# Patient Record
Sex: FEMALE | URBAN - METROPOLITAN AREA
[De-identification: names, ages, dates, MRNs, and addresses within clinical notes are randomized per-mention and may not be internally consistent; named-entity substitution may affect disease eponyms.]

---

## 2024-09-19 ENCOUNTER — APPOINTMENT (OUTPATIENT)
Dept: RADIOLOGY | Facility: MEDICAL CENTER | Age: 74
DRG: 189 | End: 2024-09-19
Attending: STUDENT IN AN ORGANIZED HEALTH CARE EDUCATION/TRAINING PROGRAM

## 2024-09-19 ENCOUNTER — HOSPITAL ENCOUNTER (INPATIENT)
Facility: MEDICAL CENTER | Age: 74
LOS: 2 days | DRG: 189 | End: 2024-09-21
Attending: STUDENT IN AN ORGANIZED HEALTH CARE EDUCATION/TRAINING PROGRAM | Admitting: STUDENT IN AN ORGANIZED HEALTH CARE EDUCATION/TRAINING PROGRAM

## 2024-09-19 DIAGNOSIS — R60.0 PERIPHERAL EDEMA: ICD-10-CM

## 2024-09-19 DIAGNOSIS — M25.561 ACUTE PAIN OF RIGHT KNEE: ICD-10-CM

## 2024-09-19 DIAGNOSIS — M25.512 ACUTE PAIN OF LEFT SHOULDER: ICD-10-CM

## 2024-09-19 DIAGNOSIS — I50.9 ACUTE ON CHRONIC CONGESTIVE HEART FAILURE, UNSPECIFIED HEART FAILURE TYPE (HCC): ICD-10-CM

## 2024-09-19 DIAGNOSIS — I50.9 CONGESTIVE HEART FAILURE, UNSPECIFIED HF CHRONICITY, UNSPECIFIED HEART FAILURE TYPE (HCC): ICD-10-CM

## 2024-09-19 DIAGNOSIS — J96.01 ACUTE HYPOXIC RESPIRATORY FAILURE (HCC): Primary | ICD-10-CM

## 2024-09-19 PROBLEM — D72.829 LEUKOCYTOSIS: Status: ACTIVE | Noted: 2024-09-19

## 2024-09-19 PROBLEM — N18.9 CHRONIC KIDNEY DISEASE: Status: ACTIVE | Noted: 2024-09-19

## 2024-09-19 LAB
ALBUMIN SERPL BCP-MCNC: 3.5 G/DL (ref 3.2–4.9)
ALBUMIN/GLOB SERPL: 1.2 G/DL
ALP SERPL-CCNC: 118 U/L (ref 30–99)
ALT SERPL-CCNC: 20 U/L (ref 2–50)
ANION GAP SERPL CALC-SCNC: 12 MMOL/L (ref 7–16)
AST SERPL-CCNC: 20 U/L (ref 12–45)
BASOPHILS # BLD AUTO: 0.1 % (ref 0–1.8)
BASOPHILS # BLD: 0.02 K/UL (ref 0–0.12)
BILIRUB SERPL-MCNC: 0.5 MG/DL (ref 0.1–1.5)
BUN SERPL-MCNC: 32 MG/DL (ref 8–22)
CALCIUM ALBUM COR SERPL-MCNC: 9 MG/DL (ref 8.5–10.5)
CALCIUM SERPL-MCNC: 8.6 MG/DL (ref 8.5–10.5)
CHLORIDE SERPL-SCNC: 102 MMOL/L (ref 96–112)
CO2 SERPL-SCNC: 19 MMOL/L (ref 20–33)
CREAT SERPL-MCNC: 1.35 MG/DL (ref 0.5–1.4)
EOSINOPHIL # BLD AUTO: 0.08 K/UL (ref 0–0.51)
EOSINOPHIL NFR BLD: 0.6 % (ref 0–6.9)
ERYTHROCYTE [DISTWIDTH] IN BLOOD BY AUTOMATED COUNT: 51.7 FL (ref 35.9–50)
EST. AVERAGE GLUCOSE BLD GHB EST-MCNC: 140 MG/DL
FLUAV RNA SPEC QL NAA+PROBE: NEGATIVE
FLUBV RNA SPEC QL NAA+PROBE: NEGATIVE
GFR SERPLBLD CREATININE-BSD FMLA CKD-EPI: 41 ML/MIN/1.73 M 2
GLOBULIN SER CALC-MCNC: 2.9 G/DL (ref 1.9–3.5)
GLUCOSE BLD STRIP.AUTO-MCNC: 140 MG/DL (ref 65–99)
GLUCOSE SERPL-MCNC: 151 MG/DL (ref 65–99)
HBA1C MFR BLD: 6.5 % (ref 4–5.6)
HCT VFR BLD AUTO: 35.7 % (ref 37–47)
HGB BLD-MCNC: 11.6 G/DL (ref 12–16)
IMM GRANULOCYTES # BLD AUTO: 0.08 K/UL (ref 0–0.11)
IMM GRANULOCYTES NFR BLD AUTO: 0.6 % (ref 0–0.9)
LYMPHOCYTES # BLD AUTO: 1.01 K/UL (ref 1–4.8)
LYMPHOCYTES NFR BLD: 7.3 % (ref 22–41)
MCH RBC QN AUTO: 31.9 PG (ref 27–33)
MCHC RBC AUTO-ENTMCNC: 32.5 G/DL (ref 32.2–35.5)
MCV RBC AUTO: 98.1 FL (ref 81.4–97.8)
MONOCYTES # BLD AUTO: 1.25 K/UL (ref 0–0.85)
MONOCYTES NFR BLD AUTO: 9.1 % (ref 0–13.4)
NEUTROPHILS # BLD AUTO: 11.34 K/UL (ref 1.82–7.42)
NEUTROPHILS NFR BLD: 82.3 % (ref 44–72)
NRBC # BLD AUTO: 0 K/UL
NRBC BLD-RTO: 0 /100 WBC (ref 0–0.2)
NT-PROBNP SERPL IA-MCNC: 1547 PG/ML (ref 0–125)
PLATELET # BLD AUTO: 314 K/UL (ref 164–446)
PMV BLD AUTO: 9.6 FL (ref 9–12.9)
POTASSIUM SERPL-SCNC: 5.4 MMOL/L (ref 3.6–5.5)
PROT SERPL-MCNC: 6.4 G/DL (ref 6–8.2)
RBC # BLD AUTO: 3.64 M/UL (ref 4.2–5.4)
RHEUMATOID FACT SER IA-ACNC: 20 IU/ML (ref 0–14)
RSV RNA SPEC QL NAA+PROBE: NEGATIVE
SARS-COV-2 RNA RESP QL NAA+PROBE: NOTDETECTED
SODIUM SERPL-SCNC: 133 MMOL/L (ref 135–145)
TROPONIN T SERPL-MCNC: 21 NG/L (ref 6–19)
WBC # BLD AUTO: 13.8 K/UL (ref 4.8–10.8)

## 2024-09-19 PROCEDURE — 86431 RHEUMATOID FACTOR QUANT: CPT

## 2024-09-19 PROCEDURE — 71045 X-RAY EXAM CHEST 1 VIEW: CPT

## 2024-09-19 PROCEDURE — 36415 COLL VENOUS BLD VENIPUNCTURE: CPT

## 2024-09-19 PROCEDURE — 0241U HCHG SARS-COV-2 COVID-19 NFCT DS RESP RNA 4 TRGT ED POC: CPT

## 2024-09-19 PROCEDURE — 99223 1ST HOSP IP/OBS HIGH 75: CPT | Performed by: STUDENT IN AN ORGANIZED HEALTH CARE EDUCATION/TRAINING PROGRAM

## 2024-09-19 PROCEDURE — 93970 EXTREMITY STUDY: CPT | Mod: 26 | Performed by: INTERNAL MEDICINE

## 2024-09-19 PROCEDURE — 85025 COMPLETE CBC W/AUTO DIFF WBC: CPT

## 2024-09-19 PROCEDURE — 93971 EXTREMITY STUDY: CPT | Mod: 26,LT | Performed by: INTERNAL MEDICINE

## 2024-09-19 PROCEDURE — 83036 HEMOGLOBIN GLYCOSYLATED A1C: CPT

## 2024-09-19 PROCEDURE — 82962 GLUCOSE BLOOD TEST: CPT

## 2024-09-19 PROCEDURE — 700111 HCHG RX REV CODE 636 W/ 250 OVERRIDE (IP): Performed by: STUDENT IN AN ORGANIZED HEALTH CARE EDUCATION/TRAINING PROGRAM

## 2024-09-19 PROCEDURE — 84484 ASSAY OF TROPONIN QUANT: CPT

## 2024-09-19 PROCEDURE — 80053 COMPREHEN METABOLIC PANEL: CPT

## 2024-09-19 PROCEDURE — 73030 X-RAY EXAM OF SHOULDER: CPT | Mod: LT

## 2024-09-19 PROCEDURE — 770020 HCHG ROOM/CARE - TELE (206)

## 2024-09-19 PROCEDURE — A9270 NON-COVERED ITEM OR SERVICE: HCPCS | Performed by: STUDENT IN AN ORGANIZED HEALTH CARE EDUCATION/TRAINING PROGRAM

## 2024-09-19 PROCEDURE — 93005 ELECTROCARDIOGRAM TRACING: CPT | Performed by: STUDENT IN AN ORGANIZED HEALTH CARE EDUCATION/TRAINING PROGRAM

## 2024-09-19 PROCEDURE — 93970 EXTREMITY STUDY: CPT

## 2024-09-19 PROCEDURE — 96374 THER/PROPH/DIAG INJ IV PUSH: CPT

## 2024-09-19 PROCEDURE — 93971 EXTREMITY STUDY: CPT | Mod: LT

## 2024-09-19 PROCEDURE — 83880 ASSAY OF NATRIURETIC PEPTIDE: CPT

## 2024-09-19 PROCEDURE — 73562 X-RAY EXAM OF KNEE 3: CPT | Mod: RT

## 2024-09-19 PROCEDURE — 99285 EMERGENCY DEPT VISIT HI MDM: CPT

## 2024-09-19 PROCEDURE — 700102 HCHG RX REV CODE 250 W/ 637 OVERRIDE(OP): Performed by: STUDENT IN AN ORGANIZED HEALTH CARE EDUCATION/TRAINING PROGRAM

## 2024-09-19 RX ORDER — HEPARIN SODIUM 5000 [USP'U]/ML
5000 INJECTION, SOLUTION INTRAVENOUS; SUBCUTANEOUS EVERY 8 HOURS
Status: DISCONTINUED | OUTPATIENT
Start: 2024-09-19 | End: 2024-09-21 | Stop reason: HOSPADM

## 2024-09-19 RX ORDER — AMLODIPINE BESYLATE 5 MG/1
5 TABLET ORAL
Status: DISCONTINUED | OUTPATIENT
Start: 2024-09-20 | End: 2024-09-21 | Stop reason: HOSPADM

## 2024-09-19 RX ORDER — ASPIRIN 81 MG/1
81 TABLET ORAL DAILY
Status: DISCONTINUED | OUTPATIENT
Start: 2024-09-20 | End: 2024-09-21 | Stop reason: HOSPADM

## 2024-09-19 RX ORDER — DEXTROSE MONOHYDRATE 25 G/50ML
25 INJECTION, SOLUTION INTRAVENOUS
Status: DISCONTINUED | OUTPATIENT
Start: 2024-09-19 | End: 2024-09-21 | Stop reason: HOSPADM

## 2024-09-19 RX ORDER — FUROSEMIDE 10 MG/ML
40 INJECTION INTRAMUSCULAR; INTRAVENOUS
Status: DISCONTINUED | OUTPATIENT
Start: 2024-09-19 | End: 2024-09-20

## 2024-09-19 RX ORDER — ACETAMINOPHEN 325 MG/1
650 TABLET ORAL EVERY 6 HOURS PRN
Status: DISCONTINUED | OUTPATIENT
Start: 2024-09-19 | End: 2024-09-21 | Stop reason: HOSPADM

## 2024-09-19 RX ORDER — HYDRALAZINE HYDROCHLORIDE 20 MG/ML
10 INJECTION INTRAMUSCULAR; INTRAVENOUS EVERY 4 HOURS PRN
Status: DISCONTINUED | OUTPATIENT
Start: 2024-09-19 | End: 2024-09-21 | Stop reason: HOSPADM

## 2024-09-19 RX ADMIN — HEPARIN SODIUM 5000 UNITS: 5000 INJECTION, SOLUTION INTRAVENOUS; SUBCUTANEOUS at 21:26

## 2024-09-19 RX ADMIN — FUROSEMIDE 40 MG: 10 INJECTION INTRAMUSCULAR; INTRAVENOUS at 19:47

## 2024-09-19 RX ADMIN — ACETAMINOPHEN 650 MG: 325 TABLET ORAL at 21:32

## 2024-09-19 SDOH — ECONOMIC STABILITY: TRANSPORTATION INSECURITY
IN THE PAST 12 MONTHS, HAS LACK OF RELIABLE TRANSPORTATION KEPT YOU FROM MEDICAL APPOINTMENTS, MEETINGS, WORK OR FROM GETTING THINGS NEEDED FOR DAILY LIVING?: NO

## 2024-09-19 SDOH — ECONOMIC STABILITY: TRANSPORTATION INSECURITY
IN THE PAST 12 MONTHS, HAS THE LACK OF TRANSPORTATION KEPT YOU FROM MEDICAL APPOINTMENTS OR FROM GETTING MEDICATIONS?: NO

## 2024-09-19 ASSESSMENT — COGNITIVE AND FUNCTIONAL STATUS - GENERAL
MOVING FROM LYING ON BACK TO SITTING ON SIDE OF FLAT BED: A LITTLE
MOVING TO AND FROM BED TO CHAIR: A LOT
DRESSING REGULAR UPPER BODY CLOTHING: A LOT
STANDING UP FROM CHAIR USING ARMS: A LOT
TURNING FROM BACK TO SIDE WHILE IN FLAT BAD: A LITTLE
SUGGESTED CMS G CODE MODIFIER DAILY ACTIVITY: CK
TOILETING: A LOT
CLIMB 3 TO 5 STEPS WITH RAILING: A LOT
MOBILITY SCORE: 14
WALKING IN HOSPITAL ROOM: A LOT
SUGGESTED CMS G CODE MODIFIER MOBILITY: CL
HELP NEEDED FOR BATHING: A LOT
DRESSING REGULAR LOWER BODY CLOTHING: A LITTLE
DAILY ACTIVITIY SCORE: 17

## 2024-09-19 ASSESSMENT — ENCOUNTER SYMPTOMS
PALPITATIONS: 0
NECK PAIN: 0
HEMOPTYSIS: 0
FEVER: 0
EYE PAIN: 0
DOUBLE VISION: 0
SHORTNESS OF BREATH: 1
COUGH: 0
SINUS PAIN: 0
ORTHOPNEA: 1
DIZZINESS: 0
BACK PAIN: 0
PHOTOPHOBIA: 0
VOMITING: 0
ABDOMINAL PAIN: 0
FLANK PAIN: 0
SPUTUM PRODUCTION: 0
CHILLS: 0
DIARRHEA: 0
HEADACHES: 0
NAUSEA: 0
CLAUDICATION: 0
EYE DISCHARGE: 0

## 2024-09-19 ASSESSMENT — LIFESTYLE VARIABLES
DOES PATIENT WANT TO STOP DRINKING: NO
TOTAL SCORE: 0
EVER HAD A DRINK FIRST THING IN THE MORNING TO STEADY YOUR NERVES TO GET RID OF A HANGOVER: NO
HOW MANY TIMES IN THE PAST YEAR HAVE YOU HAD 5 OR MORE DRINKS IN A DAY: 0
CONSUMPTION TOTAL: NEGATIVE
HAVE PEOPLE ANNOYED YOU BY CRITICIZING YOUR DRINKING: NO
TOTAL SCORE: 0
HAVE YOU EVER FELT YOU SHOULD CUT DOWN ON YOUR DRINKING: NO
ALCOHOL_USE: NO
TOTAL SCORE: 0
DOES PATIENT WANT TO STOP DRINKING: NO
EVER FELT BAD OR GUILTY ABOUT YOUR DRINKING: NO
ALCOHOL_USE: NO
EVER HAD A DRINK FIRST THING IN THE MORNING TO STEADY YOUR NERVES TO GET RID OF A HANGOVER: NO
HAVE PEOPLE ANNOYED YOU BY CRITICIZING YOUR DRINKING: NO
ON A TYPICAL DAY WHEN YOU DRINK ALCOHOL HOW MANY DRINKS DO YOU HAVE: 0
EVER FELT BAD OR GUILTY ABOUT YOUR DRINKING: NO
AVERAGE NUMBER OF DAYS PER WEEK YOU HAVE A DRINK CONTAINING ALCOHOL: 0

## 2024-09-19 ASSESSMENT — PATIENT HEALTH QUESTIONNAIRE - PHQ9
1. LITTLE INTEREST OR PLEASURE IN DOING THINGS: NOT AT ALL
2. FEELING DOWN, DEPRESSED, IRRITABLE, OR HOPELESS: NOT AT ALL
SUM OF ALL RESPONSES TO PHQ9 QUESTIONS 1 AND 2: 0

## 2024-09-19 ASSESSMENT — PAIN DESCRIPTION - PAIN TYPE
TYPE: ACUTE PAIN
TYPE: ACUTE PAIN

## 2024-09-19 ASSESSMENT — SOCIAL DETERMINANTS OF HEALTH (SDOH)
IN THE PAST 12 MONTHS, HAS THE ELECTRIC, GAS, OIL, OR WATER COMPANY THREATENED TO SHUT OFF SERVICE IN YOUR HOME?: NO
WITHIN THE LAST YEAR, HAVE YOU BEEN AFRAID OF YOUR PARTNER OR EX-PARTNER?: NO
WITHIN THE LAST YEAR, HAVE TO BEEN RAPED OR FORCED TO HAVE ANY KIND OF SEXUAL ACTIVITY BY YOUR PARTNER OR EX-PARTNER?: NO
WITHIN THE LAST YEAR, HAVE YOU BEEN KICKED, HIT, SLAPPED, OR OTHERWISE PHYSICALLY HURT BY YOUR PARTNER OR EX-PARTNER?: NO
WITHIN THE LAST YEAR, HAVE YOU BEEN HUMILIATED OR EMOTIONALLY ABUSED IN OTHER WAYS BY YOUR PARTNER OR EX-PARTNER?: NO
WITHIN THE PAST 12 MONTHS, THE FOOD YOU BOUGHT JUST DIDN'T LAST AND YOU DIDN'T HAVE MONEY TO GET MORE: NEVER TRUE
WITHIN THE PAST 12 MONTHS, YOU WORRIED THAT YOUR FOOD WOULD RUN OUT BEFORE YOU GOT THE MONEY TO BUY MORE: NEVER TRUE

## 2024-09-19 ASSESSMENT — FIBROSIS 4 INDEX: FIB4 SCORE: 1.07

## 2024-09-19 NOTE — ED TRIAGE NOTES
Chief Complaint   Patient presents with    Peripheral Edema     Pt reports bilateral upper and lower extremity edema since Friday. Hx of renal failure and heart failure. Denies SOB. Denies chest pain.     Pt is AAOx4, Greenlandic speaking, answers questions appropriately, speaking in full sentences. Is visiting here from [place].   Pt given 1G tylenol and 250 NS by EMS.

## 2024-09-19 NOTE — ED PROVIDER NOTES
"ED Provider Note    CHIEF COMPLAINT  Chief Complaint   Patient presents with    Peripheral Edema     Pt reports bilateral upper and lower extremity edema since Friday. Hx of renal failure and heart failure. Denies SOB. Denies chest pain.       EXTERNAL RECORDS REVIEWED  NO records available    HPI/ROS  LIMITATION TO HISTORY   Select: : None  OUTSIDE HISTORIAN(S):  Family Daughter in law at bedside    Patience Antony is a 74 y.o. female with history of diabetes, heart failure, CKD who presents for evaluation of peripheral edema.  The patient is from [place] and she arrived here September 9.  For the past week she has been having edema to her bilateral lower extremities left greater than right.  She is also having pain in the right knee and swelling in the left arm.  She is having orthopnea as well.  She denies any chest pain or difficulty breathing.  She was seen at the [facility] who was concerned for blood clot therefore referred to the ER.  She does not think that she is ever had a blood clot before.  She is on aspirin as well as Lasix.    PAST MEDICAL HISTORY   She has history of diabetes, heart failure, CKD    SURGICAL HISTORY  patient denies any surgical history    FAMILY HISTORY  No family history on file.    SOCIAL HISTORY  Social History     Tobacco Use    Smoking status: Not on file    Smokeless tobacco: Not on file   Substance and Sexual Activity    Alcohol use: Not on file    Drug use: Not on file    Sexual activity: Not on file       CURRENT MEDICATIONS  Home Medications       Reviewed by Tommie Barba, Student (Nurse Apprentice) on 09/19/24 at 1544  Med List Status: <None>     Medication Last Dose Status        Patient Anshul Taking any Medications                           ALLERGIES  Not on File    PHYSICAL EXAM  VITAL SIGNS: BP (!) 198/84   Pulse 80   Temp 36.5 °C (97.7 °F) (Temporal)   Resp 16   Ht 1.575 m (5' 2\")   Wt 95 kg (209 lb 7 oz)   SpO2 91%   BMI 38.31 kg/m²  "     Constitutional: Well developed, Well nourished, No acute respiratory distress, Non-toxic appearance.   HENT: Normocephalic, Atraumatic, Bilateral external ears normal, Oropharynx clear, mucous membranes are moist.  Eyes: Conjunctiva normal, No discharge. No icterus.  Neck: Normal range of motion. Supple.  Cardiovascular: Normal heart rate, Normal rhythm, No murmurs, No rubs, No gallops.   Thorax & Lungs: Clear to auscultation bilaterally, No respiratory distress, No wheezing.  Abdomen: Soft nontender normal bowel sounds no rebound masses or peritoneal signs  Skin: Warm, Dry, No erythema, No rash.   Extremities: Intact distal pulses, Edema to bilateral lower extremities, left greater than right. Edema noted to the left upper extremity, mild.   Musculoskeletal: Reports tenderness to left shoulder with decreased range of motion due to pain but is intact. Tenderness to right knee but is able to range. No erythema to the knee  Neurologic: Alert & oriented. No focal deficits noted.   Psych: Alert normal affect       EKG/LABS  Results for orders placed or performed during the hospital encounter of 09/19/24   CBC WITH DIFFERENTIAL   Result Value Ref Range    WBC 13.8 (H) 4.8 - 10.8 K/uL    RBC 3.64 (L) 4.20 - 5.40 M/uL    Hemoglobin 11.6 (L) 12.0 - 16.0 g/dL    Hematocrit 35.7 (L) 37.0 - 47.0 %    MCV 98.1 (H) 81.4 - 97.8 fL    MCH 31.9 27.0 - 33.0 pg    MCHC 32.5 32.2 - 35.5 g/dL    RDW 51.7 (H) 35.9 - 50.0 fL    Platelet Count 314 164 - 446 K/uL    MPV 9.6 9.0 - 12.9 fL    Neutrophils-Polys 82.30 (H) 44.00 - 72.00 %    Lymphocytes 7.30 (L) 22.00 - 41.00 %    Monocytes 9.10 0.00 - 13.40 %    Eosinophils 0.60 0.00 - 6.90 %    Basophils 0.10 0.00 - 1.80 %    Immature Granulocytes 0.60 0.00 - 0.90 %    Nucleated RBC 0.00 0.00 - 0.20 /100 WBC    Neutrophils (Absolute) 11.34 (H) 1.82 - 7.42 K/uL    Lymphs (Absolute) 1.01 1.00 - 4.80 K/uL    Monos (Absolute) 1.25 (H) 0.00 - 0.85 K/uL    Eos (Absolute) 0.08 0.00 - 0.51 K/uL     Baso (Absolute) 0.02 0.00 - 0.12 K/uL    Immature Granulocytes (abs) 0.08 0.00 - 0.11 K/uL    NRBC (Absolute) 0.00 K/uL   COMP METABOLIC PANEL   Result Value Ref Range    Sodium 133 (L) 135 - 145 mmol/L    Potassium 5.4 3.6 - 5.5 mmol/L    Chloride 102 96 - 112 mmol/L    Co2 19 (L) 20 - 33 mmol/L    Anion Gap 12.0 7.0 - 16.0    Glucose 151 (H) 65 - 99 mg/dL    Bun 32 (H) 8 - 22 mg/dL    Creatinine 1.35 0.50 - 1.40 mg/dL    Calcium 8.6 8.5 - 10.5 mg/dL    Correct Calcium 9.0 8.5 - 10.5 mg/dL    AST(SGOT) 20 12 - 45 U/L    ALT(SGPT) 20 2 - 50 U/L    Alkaline Phosphatase 118 (H) 30 - 99 U/L    Total Bilirubin 0.5 0.1 - 1.5 mg/dL    Albumin 3.5 3.2 - 4.9 g/dL    Total Protein 6.4 6.0 - 8.2 g/dL    Globulin 2.9 1.9 - 3.5 g/dL    A-G Ratio 1.2 g/dL   TROPONIN   Result Value Ref Range    Troponin T 21 (H) 6 - 19 ng/L   proBrain Natriuretic Peptide, NT   Result Value Ref Range    NT-proBNP 1547 (H) 0 - 125 pg/mL   ESTIMATED GFR   Result Value Ref Range    GFR (CKD-EPI) 41 (A) >60 mL/min/1.73 m 2   RHEUMATOID ARTHRITIS FACTOR   Result Value Ref Range    Rheumatoid Factor -Neph- 20 (H) 0 - 14 IU/mL   HEMOGLOBIN A1C   Result Value Ref Range    Glycohemoglobin 6.5 (H) 4.0 - 5.6 %    Est Avg Glucose 140 mg/dL   EKG (NOW)   Result Value Ref Range    Report       [facility] Emergency Dept.    Test Date:  2024  Pt Name:    IVAN CHAVEZ                Department: ER  MRN:        [id number]                      Room:        39  Gender:     Female                       Technician: [id number]  :        [id number]                   Requested By:JUAN DAVID TERRAZAS  Order #:    [id number]                    Reading MD:    Measurements  Intervals                                Axis  Rate:       79                           P:          55  VT:         143                          QRS:        66  QRSD:       86                           T:          41  QT:         351  QTc:        403    Interpretive  Statements  Sinus rhythm  No previous ECG available for comparison     POC CoV-2, FLU A/B, RSV by PCR   Result Value Ref Range    POC Influenza A RNA, PCR Negative Negative    POC Influenza B RNA, PCR Negative Negative    POC RSV, by PCR Negative Negative    POC SARS-CoV-2, PCR NotDetected NotDetected   POCT glucose device results   Result Value Ref Range    POC Glucose, Blood 140 (H) 65 - 99 mg/dL     I have independently interpreted this EKG    RADIOLOGY/PROCEDURES   I have independently interpreted the diagnostic imaging associated with this visit and am waiting the final reading from the radiologist.   My preliminary interpretation is as follows: no fracture    Radiologist interpretation:  US-EXTREMITY VENOUS LOWER BILAT   Final Result      US-EXTREMITY VENOUS UPPER UNILAT LEFT   Final Result      DX-KNEE 3 VIEWS RIGHT   Final Result         1.  No radiographic evidence of acute injury.         2. Moderate severe osteoarthritic changes of the medial compartment and patellofemoral joint.      DX-SHOULDER 2+ LEFT   Final Result      1.  Normal shoulder series.      DX-CHEST-PORTABLE (1 VIEW)   Final Result      No acute cardiac or pulmonary abnormalities are identified.          COURSE & MEDICAL DECISION MAKING    ASSESSMENT, COURSE AND PLAN  Care Narrative:   This is a 74-year-old female with history as above who is presenting for evaluation of peripheral edema to her legs and left arm, joint pain, shortness of breath.  On arrival here, she is hypoxic to 86%.  Differential diagnosis includes is not limited to CHF exacerbation, renal failure, volume overload, PE, DVT.  She does have somewhat diffuse joint pain, therefore consider viral process.  There is no monoarthritis and she has full range of motion in all her joints therefore doubt septic arthritis.    Labs with mild leukocytosis 13,800.  EKG with no acute ischemic changes.  Troponin slightly elevated 21 I think that this is likely demand ischemia from heart  failure exacerbation as her NT proBNP is slightly elevated at 1547.  Chest x-ray with no focal consolidation.  X-ray of the left shoulder was obtained and shows no fracture.  Right knee x-ray was obtained and she does have evidence of moderate to severe osteoarthritis which I do think is the cause of her knee pain.  I did perform DVT ultrasound to the bilateral lower extremities and as well as left lower extremity there is no evidence of DVT.  She has no chest pain therefore I doubt PE especially given she has no DVT.    I suspect her hypoxia is from volume overload given her elevation in BNP.  I did discuss with the on-call hospitalist, Dr. Stahl, who admit her to the hospital.  Patient and her family have been updated on lab results and they are agreeable to plan with no further questions.            ADDITIONAL PROBLEMS MANAGED  Joint pain - unclear etiology, doubt septic arthritis      DISPOSITION AND DISCUSSIONS  I have discussed management of the patient with the following physicians and LESLYE's:    Hospitalist - Dr. Stahl    Discussion of management with other QHP or appropriate source(s): None     Escalation of care considered, and ultimately not performed:diagnostic imaging such as CT-PE    Barriers to care at this time, including but not limited to:  Patient is from [place] therefore previous records are unavailable for review .     Decision tools and prescription drugs considered including, but not limited to:  None .    DISPOSITION:  Patient will be hospitalized by Dr. Stahl, hospitalist, in guarded condition.      FINAL DIAGNOSIS  1. Acute hypoxic respiratory failure (HCC) Acute   2. Peripheral edema Acute   3. Acute on chronic congestive heart failure, unspecified heart failure type (HCC) Acute   4. Acute pain of left shoulder Acute   5. Acute pain of right knee Acute        Electronically signed by: Terri Aguilera M.D., 9/19/2024 4:05 PM

## 2024-09-20 ENCOUNTER — APPOINTMENT (OUTPATIENT)
Dept: RADIOLOGY | Facility: MEDICAL CENTER | Age: 74
DRG: 189 | End: 2024-09-20

## 2024-09-20 LAB
ALBUMIN SERPL BCP-MCNC: 3.2 G/DL (ref 3.2–4.9)
ALBUMIN/GLOB SERPL: 1 G/DL
ALP SERPL-CCNC: 120 U/L (ref 30–99)
ALT SERPL-CCNC: 17 U/L (ref 2–50)
ANION GAP SERPL CALC-SCNC: 12 MMOL/L (ref 7–16)
ANION GAP SERPL CALC-SCNC: 12 MMOL/L (ref 7–16)
APPEARANCE UR: CLEAR
AST SERPL-CCNC: 19 U/L (ref 12–45)
BILIRUB SERPL-MCNC: 0.4 MG/DL (ref 0.1–1.5)
BILIRUB UR QL STRIP.AUTO: NEGATIVE
BUN SERPL-MCNC: 31 MG/DL (ref 8–22)
BUN SERPL-MCNC: 37 MG/DL (ref 8–22)
CALCIUM ALBUM COR SERPL-MCNC: 9.8 MG/DL (ref 8.5–10.5)
CALCIUM SERPL-MCNC: 9.1 MG/DL (ref 8.5–10.5)
CALCIUM SERPL-MCNC: 9.2 MG/DL (ref 8.5–10.5)
CHLORIDE SERPL-SCNC: 100 MMOL/L (ref 96–112)
CHLORIDE SERPL-SCNC: 104 MMOL/L (ref 96–112)
CO2 SERPL-SCNC: 21 MMOL/L (ref 20–33)
CO2 SERPL-SCNC: 23 MMOL/L (ref 20–33)
COLOR UR: YELLOW
CREAT SERPL-MCNC: 1.47 MG/DL (ref 0.5–1.4)
CREAT SERPL-MCNC: 1.57 MG/DL (ref 0.5–1.4)
CREAT UR-MCNC: 20.39 MG/DL
EKG IMPRESSION: NORMAL
ERYTHROCYTE [DISTWIDTH] IN BLOOD BY AUTOMATED COUNT: 51.2 FL (ref 35.9–50)
GFR SERPLBLD CREATININE-BSD FMLA CKD-EPI: 34 ML/MIN/1.73 M 2
GFR SERPLBLD CREATININE-BSD FMLA CKD-EPI: 37 ML/MIN/1.73 M 2
GLOBULIN SER CALC-MCNC: 3.3 G/DL (ref 1.9–3.5)
GLUCOSE BLD STRIP.AUTO-MCNC: 137 MG/DL (ref 65–99)
GLUCOSE BLD STRIP.AUTO-MCNC: 144 MG/DL (ref 65–99)
GLUCOSE BLD STRIP.AUTO-MCNC: 158 MG/DL (ref 65–99)
GLUCOSE BLD STRIP.AUTO-MCNC: 96 MG/DL (ref 65–99)
GLUCOSE SERPL-MCNC: 136 MG/DL (ref 65–99)
GLUCOSE SERPL-MCNC: 163 MG/DL (ref 65–99)
GLUCOSE UR STRIP.AUTO-MCNC: NEGATIVE MG/DL
HCT VFR BLD AUTO: 35.1 % (ref 37–47)
HGB BLD-MCNC: 11.5 G/DL (ref 12–16)
KETONES UR STRIP.AUTO-MCNC: NEGATIVE MG/DL
LEUKOCYTE ESTERASE UR QL STRIP.AUTO: NEGATIVE
MAGNESIUM SERPL-MCNC: 2 MG/DL (ref 1.5–2.5)
MAGNESIUM SERPL-MCNC: 2.1 MG/DL (ref 1.5–2.5)
MCH RBC QN AUTO: 31.9 PG (ref 27–33)
MCHC RBC AUTO-ENTMCNC: 32.8 G/DL (ref 32.2–35.5)
MCV RBC AUTO: 97.2 FL (ref 81.4–97.8)
MICRO URNS: NORMAL
MICROALBUMIN UR-MCNC: 5 MG/DL
MICROALBUMIN/CREAT UR: 245 MG/G (ref 0–30)
NITRITE UR QL STRIP.AUTO: NEGATIVE
PH UR STRIP.AUTO: 5.5 [PH] (ref 5–8)
PHOSPHATE SERPL-MCNC: 3.8 MG/DL (ref 2.5–4.5)
PHOSPHATE SERPL-MCNC: 4.5 MG/DL (ref 2.5–4.5)
PLATELET # BLD AUTO: 300 K/UL (ref 164–446)
PMV BLD AUTO: 9.5 FL (ref 9–12.9)
POTASSIUM SERPL-SCNC: 4.6 MMOL/L (ref 3.6–5.5)
POTASSIUM SERPL-SCNC: 4.8 MMOL/L (ref 3.6–5.5)
PROT SERPL-MCNC: 6.5 G/DL (ref 6–8.2)
PROT UR QL STRIP: NEGATIVE MG/DL
RBC # BLD AUTO: 3.61 M/UL (ref 4.2–5.4)
RBC UR QL AUTO: NEGATIVE
SODIUM SERPL-SCNC: 135 MMOL/L (ref 135–145)
SODIUM SERPL-SCNC: 137 MMOL/L (ref 135–145)
SP GR UR STRIP.AUTO: 1.01
UROBILINOGEN UR STRIP.AUTO-MCNC: 0.2 MG/DL
WBC # BLD AUTO: 12.2 K/UL (ref 4.8–10.8)

## 2024-09-20 PROCEDURE — 770020 HCHG ROOM/CARE - TELE (206)

## 2024-09-20 PROCEDURE — 36415 COLL VENOUS BLD VENIPUNCTURE: CPT

## 2024-09-20 PROCEDURE — 99233 SBSQ HOSP IP/OBS HIGH 50: CPT | Mod: FS | Performed by: INTERNAL MEDICINE

## 2024-09-20 PROCEDURE — 85027 COMPLETE CBC AUTOMATED: CPT

## 2024-09-20 PROCEDURE — A9270 NON-COVERED ITEM OR SERVICE: HCPCS | Performed by: STUDENT IN AN ORGANIZED HEALTH CARE EDUCATION/TRAINING PROGRAM

## 2024-09-20 PROCEDURE — 80048 BASIC METABOLIC PNL TOTAL CA: CPT

## 2024-09-20 PROCEDURE — 700111 HCHG RX REV CODE 636 W/ 250 OVERRIDE (IP): Performed by: STUDENT IN AN ORGANIZED HEALTH CARE EDUCATION/TRAINING PROGRAM

## 2024-09-20 PROCEDURE — 82962 GLUCOSE BLOOD TEST: CPT

## 2024-09-20 PROCEDURE — 80053 COMPREHEN METABOLIC PANEL: CPT

## 2024-09-20 PROCEDURE — A9270 NON-COVERED ITEM OR SERVICE: HCPCS | Performed by: INTERNAL MEDICINE

## 2024-09-20 PROCEDURE — 76775 US EXAM ABDO BACK WALL LIM: CPT

## 2024-09-20 PROCEDURE — A9270 NON-COVERED ITEM OR SERVICE: HCPCS

## 2024-09-20 PROCEDURE — 700102 HCHG RX REV CODE 250 W/ 637 OVERRIDE(OP): Performed by: STUDENT IN AN ORGANIZED HEALTH CARE EDUCATION/TRAINING PROGRAM

## 2024-09-20 PROCEDURE — 700102 HCHG RX REV CODE 250 W/ 637 OVERRIDE(OP)

## 2024-09-20 PROCEDURE — 83735 ASSAY OF MAGNESIUM: CPT | Mod: 91

## 2024-09-20 PROCEDURE — 84100 ASSAY OF PHOSPHORUS: CPT | Mod: 91

## 2024-09-20 PROCEDURE — 82570 ASSAY OF URINE CREATININE: CPT

## 2024-09-20 PROCEDURE — 82043 UR ALBUMIN QUANTITATIVE: CPT

## 2024-09-20 PROCEDURE — 81003 URINALYSIS AUTO W/O SCOPE: CPT

## 2024-09-20 PROCEDURE — 700102 HCHG RX REV CODE 250 W/ 637 OVERRIDE(OP): Performed by: INTERNAL MEDICINE

## 2024-09-20 RX ORDER — FUROSEMIDE 10 MG/ML
40 INJECTION INTRAMUSCULAR; INTRAVENOUS
Status: DISCONTINUED | OUTPATIENT
Start: 2024-09-21 | End: 2024-09-21 | Stop reason: HOSPADM

## 2024-09-20 RX ORDER — HYDRALAZINE HYDROCHLORIDE 10 MG/1
10 TABLET, FILM COATED ORAL EVERY 8 HOURS
Status: DISCONTINUED | OUTPATIENT
Start: 2024-09-20 | End: 2024-09-20

## 2024-09-20 RX ORDER — CALCIUM CARBONATE 500 MG/1
500 TABLET, CHEWABLE ORAL
Status: DISCONTINUED | OUTPATIENT
Start: 2024-09-20 | End: 2024-09-21 | Stop reason: HOSPADM

## 2024-09-20 RX ORDER — HYDRALAZINE HYDROCHLORIDE 25 MG/1
25 TABLET, FILM COATED ORAL EVERY 8 HOURS
Status: DISCONTINUED | OUTPATIENT
Start: 2024-09-20 | End: 2024-09-21 | Stop reason: HOSPADM

## 2024-09-20 RX ORDER — CALCIUM CARBONATE 500 MG/1
500 TABLET, CHEWABLE ORAL DAILY
Status: DISCONTINUED | OUTPATIENT
Start: 2024-09-21 | End: 2024-09-20

## 2024-09-20 RX ORDER — HYDROCHLOROTHIAZIDE 25 MG/1
12.5 TABLET ORAL
Status: DISCONTINUED | OUTPATIENT
Start: 2024-09-20 | End: 2024-09-20

## 2024-09-20 RX ADMIN — AMLODIPINE BESYLATE 5 MG: 5 TABLET ORAL at 05:28

## 2024-09-20 RX ADMIN — HEPARIN SODIUM 5000 UNITS: 5000 INJECTION, SOLUTION INTRAVENOUS; SUBCUTANEOUS at 05:28

## 2024-09-20 RX ADMIN — ASPIRIN 81 MG: 81 TABLET, COATED ORAL at 05:28

## 2024-09-20 RX ADMIN — ACETAMINOPHEN 650 MG: 325 TABLET ORAL at 13:00

## 2024-09-20 RX ADMIN — OMEPRAZOLE 20 MG: 20 CAPSULE, DELAYED RELEASE ORAL at 05:28

## 2024-09-20 RX ADMIN — ACETAMINOPHEN 650 MG: 325 TABLET ORAL at 20:41

## 2024-09-20 RX ADMIN — HYDRALAZINE HYDROCHLORIDE 25 MG: 25 TABLET ORAL at 14:47

## 2024-09-20 RX ADMIN — HYDROCHLOROTHIAZIDE 12.5 MG: 25 TABLET ORAL at 08:21

## 2024-09-20 RX ADMIN — HYDRALAZINE HYDROCHLORIDE 25 MG: 25 TABLET ORAL at 21:17

## 2024-09-20 RX ADMIN — INSULIN HUMAN 1 UNITS: 100 INJECTION, SOLUTION PARENTERAL at 20:46

## 2024-09-20 RX ADMIN — ANTACID TABLETS 500 MG: 500 TABLET, CHEWABLE ORAL at 21:17

## 2024-09-20 RX ADMIN — HEPARIN SODIUM 5000 UNITS: 5000 INJECTION, SOLUTION INTRAVENOUS; SUBCUTANEOUS at 14:44

## 2024-09-20 RX ADMIN — FUROSEMIDE 40 MG: 10 INJECTION INTRAMUSCULAR; INTRAVENOUS at 05:28

## 2024-09-20 RX ADMIN — HEPARIN SODIUM 5000 UNITS: 5000 INJECTION, SOLUTION INTRAVENOUS; SUBCUTANEOUS at 21:17

## 2024-09-20 ASSESSMENT — ENCOUNTER SYMPTOMS
PSYCHIATRIC NEGATIVE: 1
EYES NEGATIVE: 1
GASTROINTESTINAL NEGATIVE: 1
MUSCULOSKELETAL NEGATIVE: 1
NEUROLOGICAL NEGATIVE: 1
CONSTITUTIONAL NEGATIVE: 1
SHORTNESS OF BREATH: 1

## 2024-09-20 ASSESSMENT — PAIN DESCRIPTION - PAIN TYPE
TYPE: ACUTE PAIN

## 2024-09-20 ASSESSMENT — PATIENT HEALTH QUESTIONNAIRE - PHQ9
2. FEELING DOWN, DEPRESSED, IRRITABLE, OR HOPELESS: NOT AT ALL
1. LITTLE INTEREST OR PLEASURE IN DOING THINGS: NOT AT ALL
SUM OF ALL RESPONSES TO PHQ9 QUESTIONS 1 AND 2: 0

## 2024-09-20 NOTE — DISCHARGE PLANNING
Case Management Discharge Planning    Admission Date: 9/19/2024  GMLOS: 4  ALOS: 1    6-Clicks ADL Score: 17  6-Clicks Mobility Score: 14  PT and/or OT Eval ordered: Yes  Post-acute Referrals Ordered: No  Post-acute Choice Obtained: NA  Has referral(s) been sent to post-acute provider:  KIRK      Anticipated Discharge Dispo: Discharge Disposition: Discharged to home/self care (01)    DME Needed: No    Action(s) Taken: Updated Provider/Nurse on Discharge Plan Patient discussed during IDT rounds with medical team.    Escalations Completed: None    Medically Clear: No    Next Steps: Case Management will continue to follow for discharge planning needs.    Barriers to Discharge: Medical clearance    Is the patient up for discharge tomorrow: No    RN Case Manager met with patient at bedside and obtained the information used in this assessment. Patient verified accuracy of facesheet; patient lives in a two story house in [place] with her spouse, visiting family for one month.  Prior to current hospitalization, patient was independent with ADLS/IADLS. Patient has a ride and is able to attend necessary MD appointments. Patient prefers to use pharmacy in [place]. Patient has no financial concerns. Patient has support from her spouse. Denies any history of substance use and denies any diagnosis of mental illness. Patient has no PCP but uses [facility] while visiting family here in [place].    Care Transition Team Assessment    Information Source: Patient  Orientation Level: Oriented X4  Information Given By: Patient, Relative  Informant's Name: Shahnaz  Who is responsible for making decisions for patient? : Patient    Readmission Evaluation  Is this a readmission?: No    Elopement Risk  Legal Hold: No  Ambulatory or Self Mobile in Wheelchair: No-Not an Elopement Risk  Disoriented: No  Psychiatric Symptoms: None  History of Wandering: No  Elopement this Admit: (P) No  Vocalizing Wanting to Leave: No  Displays Behaviors, Body Language  Wanting to Leave: No-Not at Risk for Elopement  Elopement Risk: Not at Risk for Elopement    Interdisciplinary Discharge Planning  Does Admitting Nurse Feel This Could be a Complex Discharge?: No  Primary Care Physician: Jyoti Rubi  Lives with - Patient's Self Care Capacity: Spouse (in [place])  Patient or legal guardian wants to designate a caregiver: Yes  Caregiver name: Maida  Caregiver contact info: [phone]  ([facility]) Authorization for Release of Health Information has been completed: Yes  Support Systems: Parent, Children  Housing / Facility: 2 Story House  Name of Care Facility: N/A  Do You Take your Prescribed Medications Regularly: Yes  Able to Return to Previous ADL's: Yes  Mobility Issues: Yes  Prior Services: None  Patient Prefers to be Discharged to:: Families home    Discharge Preparedness  What is your plan after discharge?: Home with help  What are your discharge supports?: Spouse  Prior Functional Level: Uses Cane, Uses Walker  Difficulity with ADLs: Walking  Difficulity with IADLs: Driving    Functional Assesment  Prior Functional Level: Uses Cane, Uses Walker    Finances  Financial Barriers to Discharge: No  Prescription Coverage: No    Vision / Hearing Impairment  Vision Impairment : Yes  Right Eye Vision: Wears Glasses (to read)  Left Eye Vision: Wears Glasses (to read)  Hearing Impairment : No    Values / Beliefs / Concerns  Values / Beliefs Concerns : No    Advance Directive  Advance Directive?: None    Domestic Abuse  Have you ever been the victim of abuse or violence?: No  Possible Abuse/Neglect Reported to:: Not Applicable    Psychological Assessment  History of Substance Abuse: None  History of Psychiatric Problems: No    Discharge Risks or Barriers  Discharge risks or barriers?: Uninsured / underinsured    Anticipated Discharge Information  Discharge Disposition: Discharged to home/self care (01)

## 2024-09-20 NOTE — H&P
Hospital Medicine History & Physical Note    Date of Service  9/19/2024    Primary Care Physician  No primary care provider on file.      Code Status  Full Code    Chief Complaint  Chief Complaint   Patient presents with    Peripheral Edema     Pt reports bilateral upper and lower extremity edema since Friday. Hx of renal failure and heart failure. Denies SOB. Denies chest pain.       History of Presenting Illness  Patience Antony is a 74 y.o. female who presented 9/19/2024 with lower extremity edema.  Patient is a past medical history of congestive heart failure and chronic kidney disease and diabetes mellitus.  She is visiting here from [place], and states that she arrived roughly 10 days ago.  She states that since then, she has noticed worsening lower extremity edema.  She states that she was previously prescribed Lasix, but she has not been taking it.  Patient does endorse some worsening dyspnea on exertion, does have some orthopnea as well.  In the emergency department, patient was saturating approximate 86% on room air, requiring 1 to 2 L supplemental oxygen.  Chest x-ray was unremarkable.  She did have a mildly elevated BNP as well.  Lower extremity DVT ultrasounds were obtained and were negative for DVT.  Patient will be admitted for management of acute hypoxic respiratory failure secondary to congestive heart failure    I discussed the plan of care with patient and family.    Review of Systems  Review of Systems   Constitutional:  Negative for chills and fever.   HENT:  Negative for congestion, ear discharge, ear pain, nosebleeds, sinus pain and tinnitus.    Eyes:  Negative for double vision, photophobia, pain and discharge.   Respiratory:  Positive for shortness of breath. Negative for cough, hemoptysis and sputum production.    Cardiovascular:  Positive for orthopnea and leg swelling. Negative for chest pain, palpitations and claudication.   Gastrointestinal:  Negative for abdominal pain, diarrhea, nausea  and vomiting.   Genitourinary:  Negative for flank pain, frequency, hematuria and urgency.   Musculoskeletal:  Negative for back pain and neck pain.   Neurological:  Negative for dizziness and headaches.   All other systems reviewed and are negative.      Past Medical History   has no past medical history on file.    Surgical History   has no past surgical history on file.     Family History  family history is not on file.   Family history reviewed with patient. There is no family history that is pertinent to the chief complaint.     Social History       Allergies  Not on File    Medications  None       Physical Exam  Temp:  [36.5 °C (97.7 °F)] 36.5 °C (97.7 °F)  Pulse:  [68-81] 68  Resp:  [16-20] 18  BP: (169-198)/(73-84) 180/78  SpO2:  [86 %-97 %] 97 %  Blood Pressure : (!) 180/78   Temperature: 36.5 °C (97.7 °F)   Pulse: 68   Respiration: 18   Pulse Oximetry: 97 %       Physical Exam  Constitutional:       General: She is not in acute distress.     Appearance: Normal appearance. She is normal weight. She is not ill-appearing, toxic-appearing or diaphoretic.   HENT:      Head: Normocephalic and atraumatic.      Nose: Nose normal.      Mouth/Throat:      Mouth: Mucous membranes are moist.   Eyes:      Extraocular Movements: Extraocular movements intact.      Pupils: Pupils are equal, round, and reactive to light.   Cardiovascular:      Rate and Rhythm: Normal rate and regular rhythm.      Pulses: Normal pulses.      Heart sounds: Normal heart sounds. No murmur heard.     No friction rub. No gallop.   Pulmonary:      Effort: Pulmonary effort is normal. No respiratory distress.      Breath sounds: No stridor. No wheezing, rhonchi or rales.   Chest:      Chest wall: No tenderness.   Abdominal:      General: Abdomen is flat. There is no distension.      Palpations: Abdomen is soft. There is no mass.      Tenderness: There is no abdominal tenderness. There is no guarding or rebound.      Hernia: No hernia is present.    Musculoskeletal:         General: No swelling, tenderness, deformity or signs of injury.      Right lower leg: Edema present.      Left lower leg: Edema present.      Comments:      Skin:     General: Skin is warm and dry.      Capillary Refill: Capillary refill takes less than 2 seconds.      Coloration: Skin is not jaundiced or pale.      Findings: No bruising, erythema, lesion or rash.   Neurological:      General: No focal deficit present.      Mental Status: She is alert and oriented to person, place, and time. Mental status is at baseline.      Cranial Nerves: No cranial nerve deficit.      Sensory: No sensory deficit.      Motor: No weakness.      Coordination: Coordination normal.   Psychiatric:         Mood and Affect: Mood normal.         Behavior: Behavior normal.         Laboratory:  Recent Labs     09/19/24  1547   WBC 13.8*   RBC 3.64*   HEMOGLOBIN 11.6*   HEMATOCRIT 35.7*   MCV 98.1*   MCH 31.9   MCHC 32.5   RDW 51.7*   PLATELETCT 314   MPV 9.6     Recent Labs     09/19/24  1547   SODIUM 133*   POTASSIUM 5.4   CHLORIDE 102   CO2 19*   GLUCOSE 151*   BUN 32*   CREATININE 1.35   CALCIUM 8.6     Recent Labs     09/19/24  1547   ALTSGPT 20   ASTSGOT 20   ALKPHOSPHAT 118*   TBILIRUBIN 0.5   GLUCOSE 151*         Recent Labs     09/19/24  1547   NTPROBNP 1547*         Recent Labs     09/19/24  1547   TROPONINT 21*       Imaging:  US-EXTREMITY VENOUS LOWER BILAT         US-EXTREMITY VENOUS UPPER UNILAT LEFT         DX-KNEE 3 VIEWS RIGHT   Final Result         1.  No radiographic evidence of acute injury.         2. Moderate severe osteoarthritic changes of the medial compartment and patellofemoral joint.      DX-SHOULDER 2+ LEFT   Final Result      1.  Normal shoulder series.      DX-CHEST-PORTABLE (1 VIEW)   Final Result      No acute cardiac or pulmonary abnormalities are identified.      EC-ECHOCARDIOGRAM COMPLETE W/O CONT    (Results Pending)       X-Ray:  I have personally reviewed the images and  compared with prior images.  EKG:  I have personally reviewed the images and compared with prior images.    Assessment/Plan:  Justification for Admission Status  I anticipate this patient will require at least two midnights for appropriate medical management, necessitating inpatient admission because acute hypoxic respiratory failure    Patient will need a Telemetry bed on MEDICAL service .  The need is secondary to acute [place]ox[place] respiratory failure.    * Acute hypoxic respiratory failure (HCC)- (present on admission)  Assessment & Plan  Currently wearing 1 to 2 L supplemental oxygen.  She is on oxygen at home.  She does endorse orthopnea  Chest x-ray unremarkable  Likely secondary to volume overload  Continue with Lasix 40 mg IV twice daily  Continue wean off oxygen    Congestive heart failure (HCC)  Assessment & Plan  No prior echocardiogram in the patient's chart.  Patient does states that she does have a history of congestive heart failure  Given her edema and orthopnea, started patient on IV Lasix  Follow-up repeat echocardiogram    Leukocytosis  Assessment & Plan  No signs of infection at this time.  Holding off on antibiotics    Chronic kidney disease  Assessment & Plan  History of  No prior labs to determine what the patient's baseline is  Avoid NSAIDs and nephrotoxic medications  Renally dose all medications    Peripheral edema  Assessment & Plan  Likely secondary to congestive heart failure  Follow-up echocardiogram  Lasix 40 mg IV twice daily.  Patient will need monitoring with daily labs        VTE prophylaxis: heparin ppx

## 2024-09-20 NOTE — ED NOTES
Medication history reviewed with patient at bedside with family member acting as .  Med rec is complete  Allergies reviewed.   Anticoagulants: No    Patient states that she completed an antibiotic course 4 days ago. Patient could not recall name of rx- medication was prescribed in [place].    Patient also uses daily insulin, also not sure of name of medication. It is long acting , administer once daily per patient.    Gurvinder Paulino, PhT

## 2024-09-20 NOTE — ED NOTES
Bedside report received from Grace FREEMAN previous shift.   Assumed patient care. Verified patient identification.  Checked on bed, connected to monitor,  with unlabored respirations. Discussed plan of care.   Vital signs is stable. Denied any new complaints. Family at bedside  Gurney in low position, side rail up for pt safety. Call light within reach.   No needs identified at the moment. Instructed to use call light when needed.    Contraptions:gauge 18 LFA  Alert and Oriented:x4  Ambulatory:yes  Oxygen: 2LPM  Pending:for admisison

## 2024-09-20 NOTE — CARE PLAN
The patient is Stable - Low risk of patient condition declining or worsening    Shift Goals  Clinical Goals: monitor vitals, diuresis  Patient Goals: rest  Family Goals: updates    Progress made toward(s) clinical / shift goals:    Problem: Knowledge Deficit - Standard  Goal: Patient and family/care givers will demonstrate understanding of plan of care, disease process/condition, diagnostic tests and medications  Description: Target End Date:  1-3 days or as soon as patient condition allows    Document in Patient Education    1.  Patient and family/caregiver oriented to unit, equipment, visitation policy and means for communicating concern  2.  Complete/review Learning Assessment  3.  Assess knowledge level of disease process/condition, treatment plan, diagnostic tests and medications  4.  Explain disease process/condition, treatment plan, diagnostic tests and medications  Outcome: Progressing  Note: Oriented patient to unit, went over POC with patient, educated on fall risk/policies and rationales. Patient verbalized understanding,  utilized. All questions were answered.      Problem: Skin Integrity  Goal: Skin integrity is maintained or improved  Description: Target End Date:  Prior to discharge or change in level of care    Document interventions on Skin Risk/Tk flowsheet groups and corresponding LDA    1.  Assess and monitor skin integrity, appearance and/or temperature  2.  Assess risk factors for impaired skin integrity and/or pressures ulcers  3.  Implement precautions to protect skin integrity in collaboration with interdisciplinary team  4.  Implement pressure ulcer prevention protocol if at risk for skin breakdown  5.  Confirm wound care consult if at risk for skin breakdown  6.  Ensure patient use of pressure relieving devices  (Low air loss bed, waffle overlay, heel protectors, ROHO cushion, etc)  Outcome: Progressing  Note: 4 eyes skin check completed, purewick in place, perineal care  done. Upper and lower extremity edema noted. Implemented q2 turns.  Pt educated about the importance of frequent repositioning to prevent skin breakdown. Encouraged turning in bed and notifying staff for help. Pt verbalized understanding. Appropriate dressings in place. Extra pillows in place for comfort, between knees, and to float heels. Barrier cream applied as appropriate. Pt cleaned and linens changed frequently as appropriate.

## 2024-09-20 NOTE — CARE PLAN
The patient is Stable - Low risk of patient condition declining or worsening    Shift Goals  Clinical Goals: monitor vitals, diuresis  Patient Goals: rest  Family Goals: updates    Progress made toward(s) clinical / shift goals:        Problem: Pain - Standard  Goal: Alleviation of pain or a reduction in pain to the patient’s comfort goal  9/20/2024 1541 by Laura Garnica R.N.  Flowsheets (Taken 9/20/2024 1541)  Non Verbal Scale: Calm  OB Pain Intervention:   Medication - See MAR   Relaxation technique   Rest  Pain Rating Scale (NPRS): 0  Note: 1. Document pain using the appropriate pain scale per order or unit policy 2. Educate and implement non-pharmacologic comfort measures (i.e. relaxation, distraction, massage, cold/heat therapy, etc.) 3. Pain management medications as ordered 4. Reassess pain after pain med administration per policy 5. If opiods administered assess patient's response to pain medication is appropriate per POSS sedation scale   9/20/2024 1541 by Laura Garnica R.N.  Outcome: Progressing     Problem: Knowledge Deficit - Standard  Goal: Patient and family/care givers will demonstrate understanding of plan of care, disease process/condition, diagnostic tests and medications  9/20/2024 1541 by Laura Garnica R.N.  Note: 1. Patient and family/caregiver oriented to unit, equipment, visitation policy and means for communicating concern 2. Complete/review Learning Assessment 3. Assess knowledge level of disease process/condition, treatment plan, diagnostic tests and medications 4. Explain disease process/condition, treatment plan, diagnostic tests and medications   9/20/2024 1541 by Laura Garnica R.N.  Outcome: Progressing       Patient is not progressing towards the following goals:  NA

## 2024-09-20 NOTE — PROGRESS NOTES
4 Eyes Skin Assessment Completed by PETE Worrell and PETE Sanchez.    Head WDL  Ears WDL  Nose WDL  Mouth WDL  Neck WDL  Breast/Chest WDL  Shoulder Blades WDL  Spine Redness and Blanching  (R) Arm/Elbow/Hand Redness, Blanching, and Edema  (L) Arm/Elbow/Hand Redness, Blanching, and Edema  Abdomen WDL  Groin WDL  Scrotum/Coccyx/Buttocks Redness and Blanching  (R) Leg Redness, Blanching, Bruising, and Edema, swelling  (L) Leg Redness, Blanching, Bruising, Swelling, and Edema  (R) Heel/Foot/Toe Boggy  (L) Heel/Foot/Toe Boggy          Devices In Places Tele Box, Blood Pressure Cuff, and Pulse Ox      Interventions In Place Pillows and Q2 Turns    Possible Skin Injury No    Pictures Uploaded Into Epic N/A  Wound Consult Placed N/A  RN Wound Prevention Protocol Ordered No

## 2024-09-20 NOTE — ASSESSMENT & PLAN NOTE
- No signs of infection at this time.   -  Holding off on antibiotics  - No fever   - WBC 12.2 Today 09/20   - Will monitor

## 2024-09-20 NOTE — PROGRESS NOTES
Telemetry Report:      Rhythm: SR      Heart Rate: 65-77     Ectopy:  PACs          VA:  0.13           QRS:       0.07  QT:   0.35        Per Telestrip from Monitor Tech/Monitor Room

## 2024-09-20 NOTE — PROGRESS NOTES
[facility] Internal Medicine Daily Progress Note    Date of Service  9/20/2024    [facility] Team: R IM Green Team   Attending: Gianluca Washington M.d.  Senior Resident: Dr. Aden Valentin   Intern:  Dr. Vanessa Finch   Contact Number: [phone]    Chief Complaint    Patience Antony is a 74 y.o. female admitted 9/19/2024 with bilateral upper and lower extremity edema since Friday     Hospital Course    Patience Antony is a 74 y.o. female who presented 9/19/2024 with lower extremity edema.  Patient is a past medical history of congestive heart failure and chronic kidney disease and diabetes mellitus.  She is visiting here from [place], and states that she arrived roughly 10 days ago.  She states that since then, she has noticed worsening lower extremity edema.  She states that she was previously prescribed Lasix, but she has not been taking it.  Patient does endorse some worsening dyspnea on exertion, does have some orthopnea as well.  In the emergency department, patient was saturating approximate 86% on room air, requiring 1 to 2 L supplemental oxygen.  Chest x-ray was unremarkable.  She did have a mildly elevated BNP as well.  Lower extremity DVT ultrasounds were obtained and were negative for DVT.      Patient was admitted on the floor for management of acute hypoxic respiratory failure secondary to congestive heart failure     Interval Problem Update    Overnight events: None     She is a pleasant lady.   - Speaks [place] but understands a little bit of english and was able to express her self well. Her daughter is also present at the bed side for her assistance.  - Overall, she is doing fine and says that she feels much better than yesterday.   - She does not have any new complaints.   - She is currently on Lasix and maintaining a negative fluid balance of 450 ml.  According to the records [in [place]] presented by her daughter shows that her home dose of Lasix is 40 Mg once daily. Since she is euvolemic, will resume her home  dose.  - Amlodipine stopped due to lower extremity edema.  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  - Her blood pressures are high despite being on multiple medications. She is started on scheduled hydralazine .  - She started on incentive spirometry.  - She has a high BUN of 31 and an increasing Creatinine of 1.47. Her GFR has decreased from 41 to 37 since 09/19. - Ultrasound KUB ordered.  - She is pending Echocardiogram. Previous echo not on file.    - Currently, she is maintaining 90% saturation on room air.  - Ambulated out of bed    Her family was present on the bedside including her daughter, son-in-law, 2 grandchildren.  They have been made aware of her current condition and the management plan for this patient.  All questions asked by them were answered by the primary team.    I have discussed this patient's plan of care and discharge plan at IDT rounds today with Case Management, Nursing, Nursing leadership, and other members of the IDT team.    Consultants/Specialty    None    Code Status  Full Code    Disposition  The patient is not medically cleared for discharge to home or a post-acute facility.      I have placed the appropriate orders for post-discharge needs.    Review of Systems  Review of Systems   Constitutional: Negative.    HENT: Negative.     Eyes: Negative.    Respiratory:  Positive for shortness of breath.    Cardiovascular:  Positive for leg swelling.   Gastrointestinal: Negative.    Genitourinary: Negative.    Musculoskeletal: Negative.    Skin: Negative.    Neurological: Negative.    Endo/Heme/Allergies: Negative.    Psychiatric/Behavioral: Negative.          Physical Exam  Temp:  [35.9 °C (96.6 °F)-36.6 °C (97.9 °F)] 36.4 °C (97.5 °F)  Pulse:  [63-81] 73  Resp:  [16-20] 18  BP: (165-198)/(71-84) 172/74  SpO2:  [86 %-97 %] 91 %    Physical Exam  Constitutional:       Appearance: Normal appearance. She is normal weight.   HENT:      Head: Normocephalic and  atraumatic.      Right Ear: Tympanic membrane, ear canal and external ear normal.      Left Ear: Tympanic membrane, ear canal and external ear normal.      Nose: Nose normal.      Mouth/Throat:      Mouth: Mucous membranes are moist.   Eyes:      Extraocular Movements: Extraocular movements intact.      Pupils: Pupils are equal, round, and reactive to light.   Cardiovascular:      Rate and Rhythm: Normal rate and regular rhythm.      Pulses: Normal pulses.      Heart sounds: Normal heart sounds.   Pulmonary:      Effort: Pulmonary effort is normal.      Breath sounds: Normal breath sounds.      Comments: Mild crackles heard at bilateral lung bases  Abdominal:      General: Bowel sounds are normal.      Palpations: Abdomen is soft.   Musculoskeletal:         General: Swelling present. Normal range of motion.      Cervical back: Normal range of motion and neck supple.      Right lower leg: No edema.      Left lower leg: No edema.      Comments: Varicosities present at bilateral lower extremities    Bilateral upper limb and lower limb edema resolved   Skin:     General: Skin is warm.      Capillary Refill: Capillary refill takes less than 2 seconds.   Neurological:      General: No focal deficit present.      Mental Status: She is alert. Mental status is at baseline.   Psychiatric:         Mood and Affect: Mood normal.         Behavior: Behavior normal.         Thought Content: Thought content normal.         Judgment: Judgment normal.         Fluids    Intake/Output Summary (Last 24 hours) at 9/20/2024 1202  Last data filed at 9/20/2024 0900  Gross per 24 hour   Intake 800 ml   Output 2450 ml   Net -1650 ml       Laboratory  Recent Labs     09/19/24  1547 09/20/24  0116   WBC 13.8* 12.2*   RBC 3.64* 3.61*   HEMOGLOBIN 11.6* 11.5*   HEMATOCRIT 35.7* 35.1*   MCV 98.1* 97.2   MCH 31.9 31.9   MCHC 32.5 32.8   RDW 51.7* 51.2*   PLATELETCT 314 300   MPV 9.6 9.5     Recent Labs     09/19/24  1547 09/20/24  0116   SODIUM 133*  137   POTASSIUM 5.4 4.8   CHLORIDE 102 104   CO2 19* 21   GLUCOSE 151* 136*   BUN 32* 31*   CREATININE 1.35 1.47*   CALCIUM 8.6 9.2                   Imaging  US-EXTREMITY VENOUS LOWER BILAT   Final Result      US-EXTREMITY VENOUS UPPER UNILAT LEFT   Final Result      DX-KNEE 3 VIEWS RIGHT   Final Result         1.  No radiographic evidence of acute injury.         2. Moderate severe osteoarthritic changes of the medial compartment and patellofemoral joint.      DX-SHOULDER 2+ LEFT   Final Result      1.  Normal shoulder series.      DX-CHEST-PORTABLE (1 VIEW)   Final Result      No acute cardiac or pulmonary abnormalities are identified.      EC-ECHOCARDIOGRAM COMPLETE W/O CONT    (Results Pending)   US-RENAL    (Results Pending)        Assessment/Plan  Problem Representation:    * Acute hypoxic respiratory failure (HCC)- (present on admission)  Assessment & Plan  RESOLVED   - Currently on room air  - Chest x-ray unremarkable  - Incentive Spirometry   - Will ambulate out of bed     Congestive heart failure (HCC)  Assessment & Plan  - No prior echocardiogram in the patient's chart.    - Patient does states that she does have a history of congestive heart failure  - On Lasix   - Echo pending   - Lasix 40 mg tapered to home dose- once daily.   - CMP, Mg, Phosp daily   - I/O charting   - Daily weights   - Amlodipine stopped due to LE Swelling 09/20   - Maintaining negative fluid balance  - PT/OT     Peripheral edema  Assessment & Plan  RESOLVING   - Likely secondary to congestive heart failure  - Follow-up echocardiogram pending   - Lasix 40 mg tapered to home dose- once daily.   - CMP, Mg, Phosp daily   - I/O charting   - Daily weights   - Amlodipine stopped due to LE Swelling 09/20   - Maintaining negative fluid balance      Leukocytosis  Assessment & Plan  - No signs of infection at this time.   -  Holding off on antibiotics  - No fever   - WBC 12.2 Today 09/20   - Will monitor     Chronic kidney disease  Assessment  & Plan  Patient giving a history  No prior labs to determine what the patient's baseline is  - She has a high BUN of 31 and an increasing Creatinine of 1.47. Her GFR has decreased from 41 to 37 since 09/19.   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  - Avoid NSAIDs and nephrotoxic medications  - Renally dose all medications  - Daily BMP          VTE prophylaxis: heparin ppx    I have performed a physical exam and reviewed and updated ROS and Plan today (9/20/2024). In review of yesterday's note (9/19/2024), there are no changes except as documented above.

## 2024-09-20 NOTE — ASSESSMENT & PLAN NOTE
RESOLVED   - Currently on room air  - Chest x-ray unremarkable  - Incentive Spirometry   - Will ambulate out of bed

## 2024-09-20 NOTE — ASSESSMENT & PLAN NOTE
- No prior echocardiogram in the patient's chart.    - Patient does states that she does have a history of congestive heart failure  - On Lasix   - Echo pending   - Lasix 40 mg tapered to home dose- once daily.   - CMP, Mg, Phosp daily   - I/O charting   - Daily weights   - Amlodipine stopped due to LE Swelling 09/20   - Maintaining negative fluid balance  - PT/OT

## 2024-09-20 NOTE — ASSESSMENT & PLAN NOTE
Patient giving a history  No prior labs to determine what the patient's baseline is  - She has a high BUN of 31 and an increasing Creatinine of 1.47. Her GFR has decreased from 41 to 37 since 09/19.   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  - Avoid NSAIDs and nephrotoxic medications  - Renally dose all medications  - Daily BMP

## 2024-09-20 NOTE — ASSESSMENT & PLAN NOTE
RESOLVING   - Likely secondary to congestive heart failure  - Follow-up echocardiogram pending   - Lasix 40 mg tapered to home dose- once daily.   - CMP, Mg, Phosp daily   - I/O charting   - Daily weights   - Amlodipine stopped due to LE Swelling 09/20   - Maintaining negative fluid balance

## 2024-09-21 ENCOUNTER — APPOINTMENT (OUTPATIENT)
Dept: CARDIOLOGY | Facility: MEDICAL CENTER | Age: 74
DRG: 189 | End: 2024-09-21

## 2024-09-21 ENCOUNTER — PHARMACY VISIT (OUTPATIENT)
Dept: PHARMACY | Facility: MEDICAL CENTER | Age: 74
End: 2024-09-21
Payer: COMMERCIAL

## 2024-09-21 VITALS
SYSTOLIC BLOOD PRESSURE: 141 MMHG | DIASTOLIC BLOOD PRESSURE: 81 MMHG | HEIGHT: 62 IN | BODY MASS INDEX: 39.84 KG/M2 | RESPIRATION RATE: 16 BRPM | WEIGHT: 216.49 LBS | OXYGEN SATURATION: 93 % | TEMPERATURE: 97.4 F | HEART RATE: 73 BPM

## 2024-09-21 PROBLEM — J96.01 ACUTE HYPOXIC RESPIRATORY FAILURE (HCC): Status: RESOLVED | Noted: 2024-09-19 | Resolved: 2024-09-21

## 2024-09-21 PROBLEM — R60.0 PERIPHERAL EDEMA: Status: RESOLVED | Noted: 2024-09-19 | Resolved: 2024-09-21

## 2024-09-21 PROBLEM — D72.829 LEUKOCYTOSIS: Status: RESOLVED | Noted: 2024-09-19 | Resolved: 2024-09-21

## 2024-09-21 LAB
ANION GAP SERPL CALC-SCNC: 13 MMOL/L (ref 7–16)
BASOPHILS # BLD AUTO: 0.3 % (ref 0–1.8)
BASOPHILS # BLD: 0.03 K/UL (ref 0–0.12)
BUN SERPL-MCNC: 37 MG/DL (ref 8–22)
CALCIUM SERPL-MCNC: 9.1 MG/DL (ref 8.5–10.5)
CHLORIDE SERPL-SCNC: 102 MMOL/L (ref 96–112)
CO2 SERPL-SCNC: 21 MMOL/L (ref 20–33)
CREAT SERPL-MCNC: 1.47 MG/DL (ref 0.5–1.4)
EOSINOPHIL # BLD AUTO: 0.24 K/UL (ref 0–0.51)
EOSINOPHIL NFR BLD: 2.1 % (ref 0–6.9)
ERYTHROCYTE [DISTWIDTH] IN BLOOD BY AUTOMATED COUNT: 49.5 FL (ref 35.9–50)
GFR SERPLBLD CREATININE-BSD FMLA CKD-EPI: 37 ML/MIN/1.73 M 2
GLUCOSE BLD STRIP.AUTO-MCNC: 116 MG/DL (ref 65–99)
GLUCOSE BLD STRIP.AUTO-MCNC: 124 MG/DL (ref 65–99)
GLUCOSE SERPL-MCNC: 125 MG/DL (ref 65–99)
HCT VFR BLD AUTO: 37 % (ref 37–47)
HGB BLD-MCNC: 12.2 G/DL (ref 12–16)
IMM GRANULOCYTES # BLD AUTO: 0.05 K/UL (ref 0–0.11)
IMM GRANULOCYTES NFR BLD AUTO: 0.4 % (ref 0–0.9)
LV EJECT FRACT  99904: 65
LV EJECT FRACT MOD 2C 99903: 72.37
LV EJECT FRACT MOD 4C 99902: 67.55
LYMPHOCYTES # BLD AUTO: 1.86 K/UL (ref 1–4.8)
LYMPHOCYTES NFR BLD: 16.5 % (ref 22–41)
MAGNESIUM SERPL-MCNC: 2.1 MG/DL (ref 1.5–2.5)
MCH RBC QN AUTO: 31.9 PG (ref 27–33)
MCHC RBC AUTO-ENTMCNC: 33 G/DL (ref 32.2–35.5)
MCV RBC AUTO: 96.6 FL (ref 81.4–97.8)
MONOCYTES # BLD AUTO: 1.13 K/UL (ref 0–0.85)
MONOCYTES NFR BLD AUTO: 10.1 % (ref 0–13.4)
NEUTROPHILS # BLD AUTO: 7.93 K/UL (ref 1.82–7.42)
NEUTROPHILS NFR BLD: 70.6 % (ref 44–72)
NRBC # BLD AUTO: 0 K/UL
NRBC BLD-RTO: 0 /100 WBC (ref 0–0.2)
PHOSPHATE SERPL-MCNC: 4.2 MG/DL (ref 2.5–4.5)
PLATELET # BLD AUTO: 314 K/UL (ref 164–446)
PMV BLD AUTO: 9.4 FL (ref 9–12.9)
POTASSIUM SERPL-SCNC: 4.5 MMOL/L (ref 3.6–5.5)
RBC # BLD AUTO: 3.83 M/UL (ref 4.2–5.4)
SODIUM SERPL-SCNC: 136 MMOL/L (ref 135–145)
WBC # BLD AUTO: 11.2 K/UL (ref 4.8–10.8)

## 2024-09-21 PROCEDURE — A9270 NON-COVERED ITEM OR SERVICE: HCPCS | Performed by: INTERNAL MEDICINE

## 2024-09-21 PROCEDURE — 700111 HCHG RX REV CODE 636 W/ 250 OVERRIDE (IP): Performed by: STUDENT IN AN ORGANIZED HEALTH CARE EDUCATION/TRAINING PROGRAM

## 2024-09-21 PROCEDURE — 93971 EXTREMITY STUDY: CPT | Mod: RT

## 2024-09-21 PROCEDURE — 80048 BASIC METABOLIC PNL TOTAL CA: CPT

## 2024-09-21 PROCEDURE — 700102 HCHG RX REV CODE 250 W/ 637 OVERRIDE(OP)

## 2024-09-21 PROCEDURE — A9270 NON-COVERED ITEM OR SERVICE: HCPCS

## 2024-09-21 PROCEDURE — 99239 HOSP IP/OBS DSCHRG MGMT >30: CPT | Mod: FS | Performed by: INTERNAL MEDICINE

## 2024-09-21 PROCEDURE — 700117 HCHG RX CONTRAST REV CODE 255

## 2024-09-21 PROCEDURE — 83735 ASSAY OF MAGNESIUM: CPT

## 2024-09-21 PROCEDURE — 700102 HCHG RX REV CODE 250 W/ 637 OVERRIDE(OP): Performed by: STUDENT IN AN ORGANIZED HEALTH CARE EDUCATION/TRAINING PROGRAM

## 2024-09-21 PROCEDURE — 82962 GLUCOSE BLOOD TEST: CPT

## 2024-09-21 PROCEDURE — 700102 HCHG RX REV CODE 250 W/ 637 OVERRIDE(OP): Performed by: INTERNAL MEDICINE

## 2024-09-21 PROCEDURE — 85025 COMPLETE CBC W/AUTO DIFF WBC: CPT

## 2024-09-21 PROCEDURE — RXMED WILLOW AMBULATORY MEDICATION CHARGE

## 2024-09-21 PROCEDURE — 93306 TTE W/DOPPLER COMPLETE: CPT

## 2024-09-21 PROCEDURE — 93306 TTE W/DOPPLER COMPLETE: CPT | Mod: 26 | Performed by: INTERNAL MEDICINE

## 2024-09-21 PROCEDURE — 84100 ASSAY OF PHOSPHORUS: CPT

## 2024-09-21 PROCEDURE — A9270 NON-COVERED ITEM OR SERVICE: HCPCS | Performed by: STUDENT IN AN ORGANIZED HEALTH CARE EDUCATION/TRAINING PROGRAM

## 2024-09-21 PROCEDURE — 700111 HCHG RX REV CODE 636 W/ 250 OVERRIDE (IP): Mod: JZ

## 2024-09-21 RX ORDER — SPIRONOLACTONE 25 MG/1
25 TABLET ORAL
Status: DISCONTINUED | OUTPATIENT
Start: 2024-09-21 | End: 2024-09-21

## 2024-09-21 RX ORDER — FUROSEMIDE 40 MG
40 TABLET ORAL DAILY
Qty: 30 TABLET | Refills: 30 | Status: SHIPPED | OUTPATIENT
Start: 2024-09-21

## 2024-09-21 RX ORDER — ASPIRIN 81 MG/1
81 TABLET ORAL DAILY
Qty: 100 TABLET | Refills: 0 | Status: SHIPPED | OUTPATIENT
Start: 2024-09-22

## 2024-09-21 RX ORDER — LOSARTAN POTASSIUM 25 MG/1
25 TABLET ORAL DAILY
Qty: 30 TABLET | Refills: 0 | Status: SHIPPED | OUTPATIENT
Start: 2024-09-22

## 2024-09-21 RX ORDER — HYDRALAZINE HYDROCHLORIDE 25 MG/1
25 TABLET, FILM COATED ORAL EVERY 8 HOURS
Qty: 90 TABLET | Refills: 0 | Status: SHIPPED | OUTPATIENT
Start: 2024-09-21

## 2024-09-21 RX ORDER — ACETAMINOPHEN 325 MG/1
650 TABLET ORAL EVERY 6 HOURS PRN
Qty: 30 TABLET | Refills: 0 | Status: SHIPPED | OUTPATIENT
Start: 2024-09-21

## 2024-09-21 RX ORDER — HYDROXYZINE HYDROCHLORIDE 10 MG/1
10 TABLET, FILM COATED ORAL ONCE
Status: COMPLETED | OUTPATIENT
Start: 2024-09-21 | End: 2024-09-21

## 2024-09-21 RX ORDER — LOSARTAN POTASSIUM 50 MG/1
25 TABLET ORAL
Status: DISCONTINUED | OUTPATIENT
Start: 2024-09-21 | End: 2024-09-21 | Stop reason: HOSPADM

## 2024-09-21 RX ADMIN — HEPARIN SODIUM 5000 UNITS: 5000 INJECTION, SOLUTION INTRAVENOUS; SUBCUTANEOUS at 12:01

## 2024-09-21 RX ADMIN — ACETAMINOPHEN 650 MG: 325 TABLET ORAL at 15:13

## 2024-09-21 RX ADMIN — OMEPRAZOLE 20 MG: 20 CAPSULE, DELAYED RELEASE ORAL at 06:11

## 2024-09-21 RX ADMIN — HYDRALAZINE HYDROCHLORIDE 25 MG: 25 TABLET ORAL at 12:01

## 2024-09-21 RX ADMIN — ASPIRIN 81 MG: 81 TABLET, COATED ORAL at 06:11

## 2024-09-21 RX ADMIN — HYDROXYZINE HYDROCHLORIDE 10 MG: 10 TABLET ORAL at 00:45

## 2024-09-21 RX ADMIN — FUROSEMIDE 40 MG: 10 INJECTION INTRAMUSCULAR; INTRAVENOUS at 06:12

## 2024-09-21 RX ADMIN — HYDRALAZINE HYDROCHLORIDE 25 MG: 25 TABLET ORAL at 06:11

## 2024-09-21 RX ADMIN — HUMAN ALBUMIN MICROSPHERES AND PERFLUTREN 3 ML: 10; .22 INJECTION, SOLUTION INTRAVENOUS at 12:34

## 2024-09-21 RX ADMIN — HEPARIN SODIUM 5000 UNITS: 5000 INJECTION, SOLUTION INTRAVENOUS; SUBCUTANEOUS at 06:11

## 2024-09-21 RX ADMIN — LOSARTAN POTASSIUM 25 MG: 50 TABLET, FILM COATED ORAL at 11:54

## 2024-09-21 ASSESSMENT — FIBROSIS 4 INDEX: FIB4 SCORE: 1.1

## 2024-09-21 ASSESSMENT — PAIN DESCRIPTION - PAIN TYPE
TYPE: ACUTE PAIN

## 2024-09-21 ASSESSMENT — PATIENT HEALTH QUESTIONNAIRE - PHQ9
SUM OF ALL RESPONSES TO PHQ9 QUESTIONS 1 AND 2: 0
1. LITTLE INTEREST OR PLEASURE IN DOING THINGS: NOT AT ALL
2. FEELING DOWN, DEPRESSED, IRRITABLE, OR HOPELESS: NOT AT ALL

## 2024-09-21 NOTE — PROGRESS NOTES
Tele Summary    Rate: 65-82  BPM  Rhythm: Sinus rhythm  Ectopy: PVCs    .13/.07/.35      No media provided

## 2024-09-21 NOTE — CARE PLAN
The patient is Stable - Low risk of patient condition declining or worsening    Shift Goals  Clinical Goals: BG control, monitor I/Os, VSS  Patient Goals: Rest, comfort  Family Goals: Get well, updates      Problem: Mobility  Goal: Patient's capacity to carry out activities will improve  Description: Target End Date:  Prior to discharge or change in level of care    1.  Assess for barriers to mobility/activity  2.  Implement activity per interdisciplinary team recommendations  3.  Target activity level identified and patient/family/caregiver aware of goal  4.  Provide assistive devices  5.  Instruct patient/caregiver on proper use of assistive/adaptive devices  6.  Schedule activities and rest periods to decrease effects of fatigue  7.  Encourage mobilization to extent of ability  8.  Maintain proper body alignment  9.  Provide adequate pain management to allow progressive mobilization  10. Implement pace maker precautions as needed  Outcome: Progressing       Progress made toward(s) clinical / shift goals: patient mobilizes in room and performs ADLs; patient medically cleared to go home once discharged this afternoon and patient has family to support her as needed       Problem: Fall Risk  Goal: Patient will remain free from falls  Description: Target End Date:  Prior to discharge or change in level of care    Document interventions on the [facility] Fall Risk Assessment    1.  Assess for fall risk factors  2.  Implement fall precautions  Outcome: Progressing       Progress made toward(s) clinical / shift goals: patient is a moderate fall risk; bed alarm on, call light in reach, education about mobilizing safely and utilizing assistive devices when needed given to patient prior to discharge       Problem: Care Map:  Day of Discharge Optimal Outcome for the Heart Failure Patient  Goal: Day of Discharge:  Optimal Care of the heart failure patient  Description: Target End Date:  Prior to discharge or change in level  of care  Outcome: Progressing    Progress made toward(s) clinical / shift goals: HF education provided to pt and family prior to discharge; medications also discussed with evidence of learning from both parties        Patient is not progressing towards the following goals: NA

## 2024-09-21 NOTE — DISCHARGE INSTRUCTIONS
-Por favor siga seguimiento con tay medico de cabecera   -Por favor lleve yesica examenes de seguimiento a la florentino con tay doctor  -por favor siga tomando yesica medicamentos       HF Patient Discharge Instructions  Monitor your weight daily, and maintain a weight chart, to track your weight changes.   Activity as tolerated, unless your Doctor has ordered otherwise.   Follow a low fat, low cholesterol, low salt diet unless instructed otherwise by your Doctor. Read the labels on the back of food products and track your intake of fat, cholesterol and salt.   Fluid Restriction No. If a Fluid Restriction has been ordered by your Doctor, measure fluids with a measuring cup to ensure that you are not exceeding the restriction.   No smoking.  Oxygen No. If your Doctor has ordered that you wear Oxygen at home, it is important to wear it as ordered.  Did you receive an explanation from staff on the importance of taking each of your medications and why it is necessary to keep taking them unless your doctor says to stop? Yes  Were all of your questions answered about how to manage your heart failure and what to do if you have increased signs and symptoms after you go home? Yes  Do you feel like your heart failure care team involved you in the care treatment plan and allowed you to make decisions regarding your care while in the hospital and addressed any discharge needs you might have? Yes    See the educational handout provided at discharge for more information on monitoring your daily weight, activity and diet. This also explains more about Heart Failure, symptoms of a flare-up and some of the tests that you have undergone.     Warning Signs of a Flare-Up include:  Swelling in the ankles or lower legs.  Shortness of breath, while at rest, or while doing normal activities.   Shortness of breath at night when in bed, or coughing in bed.   Requiring more pillows to sleep at night, or needing to sit up at night to sleep.  Feeling weak,  dizzy or fatigued.     When to call your Doctor:  Call your Primary Care Physician or Cardiologist if:   You experience any pain radiating to your jaw or neck.  You have any difficulty breathing.  You experience weight gain of 3 lbs in a day or 5 lbs in a week.   You feel any palpitations or irregular heartbeats.  You become dizzy or lose consciousness.   Please access the [facility] My HF Guide/Heart Failure Interactive Workbook:   [email or web address]

## 2024-09-21 NOTE — PROGRESS NOTES
Telemetry Report:      Rhythm: SR      Heart Rate: 60-76   Ectopy:  PVCs          [place]:  0.13           QRS:       0.06  QT:   0.37        Per Telestrip from Monitor Room

## 2024-09-21 NOTE — DISCHARGE SUMMARY
[facility] Internal Medicine Discharge Summary    Attending: Gianluca Washington M.d.  Senior Resident: Dr. Samaniego  Intern:  Dr. Finch   Contact Number: [phone]    CHIEF COMPLAINT ON ADMISSION  Chief Complaint   Patient presents with    Peripheral Edema     Pt reports bilateral upper and lower extremity edema since Friday. Hx of renal failure and heart failure. Denies SOB. Denies chest pain.       Reason for Admission  ems     Admission Date  9/19/2024    CODE STATUS  Full Code    HPI & HOSPITAL COURSE  Patience Antony is a 74 y.o. female who presented 9/19/2024 with lower extremity edema. Patient is a past medical history of congestive heart failure and chronic kidney disease and diabetes mellitus. She is visiting here from [place], and states that she arrived roughly 10 days ago. She states that since then, she has noticed worsening lower extremity edema. She states that she was previously prescribed Lasix, but she has not been taking it. Patient does endorse some worsening dyspnea on exertion, does have some orthopnea as well. In the emergency department, patient was saturating approximate 86% on room air, requiring 1 to 2 L supplemental oxygen. Chest x-ray was unremarkable. She did have a mildly elevated BNP as well. Lower extremity DVT ultrasounds were obtained and were negative for DVT.     On day of discharge the patient echocardiogram was normal with ejection fraction of 95% no abnormalities at all, patient was restarted on Lasix home dose and was encouraged to complain with home medications to prevent future admissions.  Patient will follow-up with PCP outpatient with new labs, lower extremity edema has resolved patient on room air.    Therefore, she is discharged in good and stable condition to home with close outpatient follow-up.    The patient recovered much more quickly than anticipated on admission.    Discharge Date  9/21/24    Physical Exam on Day of Discharge  Physical Exam  Constitutional:       General:  She is not in acute distress.     Appearance: She is not ill-appearing.   Eyes:      General:         Right eye: No discharge.         Left eye: No discharge.   Cardiovascular:      Rate and Rhythm: Normal rate.      Heart sounds: No murmur heard.     No gallop.   Pulmonary:      Effort: No respiratory distress.      Breath sounds: No stridor. No wheezing.   Abdominal:      General: There is no distension.      Tenderness: There is no abdominal tenderness.   Musculoskeletal:         General: No swelling, tenderness or deformity.      Cervical back: No rigidity or tenderness.   Lymphadenopathy:      Cervical: No cervical adenopathy.   Skin:     Capillary Refill: Capillary refill takes less than 2 seconds.      Coloration: Skin is not jaundiced or pale.      Findings: No bruising.   Neurological:      General: No focal deficit present.         FOLLOW UP ITEMS POST DISCHARGE  Please follow-up with PCP    DISCHARGE DIAGNOSES  Principal Problem (Resolved):    Acute hypoxic respiratory failure (HCC) (POA: Yes)  Active Problems:    Chronic kidney disease (POA: Unknown)    Congestive heart failure (HCC) (POA: Unknown)  Resolved Problems:    Peripheral edema (POA: Unknown)    Leukocytosis (POA: Unknown)      FOLLOW UP  No future appointments.  Noy Ferguson Md-Pcp  [address] [facility] [id number] [phone]    Follow up        MEDICATIONS ON DISCHARGE     Medication List        START taking these medications        Instructions   acetaminophen 325 MG Tabs  Commonly known as: Tylenol   Take 2 Tablets by mouth every 6 hours as needed for Moderate Pain.  Dose: 650 mg     aspirin 81 MG EC tablet  Start taking on: September 22, 2024   Take 1 Tablet by mouth every day.  Dose: 81 mg     furosemide 40 MG Tabs  Commonly known as: Lasix   Take 1 Tablet by mouth every day.  Dose: 40 mg     hydrALAZINE 25 MG Tabs  Commonly known as: Apresoline   Take 1 Tablet by mouth every 8 hours.  Dose: 25 mg     losartan 25 MG  Tabs  Start taking on: September 22, 2024  Commonly known as: Cozaar   Take 1 Tablet by mouth every day.  Dose: 25 mg            CONTINUE taking these medications        Instructions   INSULIN REGULAR HUMAN INJ   Inject 23 Units under the skin every day.  Dose: 23 Units            STOP taking these medications      NON SPECIFIED              Allergies  Not on File    DIET  Orders Placed This Encounter   Procedures    Diet Order Diet: Consistent CHO (Diabetic)     Standing Status:   Standing     Number of Occurrences:   1     Order Specific Question:   Diet:     Answer:   Consistent CHO (Diabetic) [4]       ACTIVITY  As tolerated.  Weight bearing as tolerated    CONSULTATIONS  None     PROCEDURES  None     LABORATORY  Lab Results   Component Value Date    SODIUM 136 09/21/2024    POTASSIUM 4.5 09/21/2024    CHLORIDE 102 09/21/2024    CO2 21 09/21/2024    GLUCOSE 125 (H) 09/21/2024    BUN 37 (H) 09/21/2024    CREATININE 1.47 (H) 09/21/2024        Lab Results   Component Value Date    WBC 11.2 (H) 09/21/2024    HEMOGLOBIN 12.2 09/21/2024    HEMATOCRIT 37.0 09/21/2024    PLATELETCT 314 09/21/2024        Total time of the discharge process exceeds 30 minutes.

## 2024-09-21 NOTE — PROGRESS NOTES
Patient c/o of increased pain to the right upper arm. Redness/warmth/swelling noted more on the right UE than left UE. MD Dupree notified, who ordered a right UE US.

## 2024-09-21 NOTE — CARE PLAN
The patient is Stable - Low risk of patient condition declining or worsening    Shift Goals  Clinical Goals: diuresis, skin integrity, monitor vitals/labs  Patient Goals: feel better  Family Goals: updates    Progress made toward(s) clinical / shift goals:    Problem: Pain - Standard  Goal: Alleviation of pain or a reduction in pain to the patient’s comfort goal  Description: Target End Date:  Prior to discharge or change in level of care    Document on Vitals flowsheet    1.  Document pain using the appropriate pain scale per order or unit policy  2.  Educate and implement non-pharmacologic comfort measures (i.e. relaxation, distraction, massage, cold/heat therapy, etc.)  3.  Pain management medications as ordered  4.  Reassess pain after pain med administration per policy  5.  If opiods administered assess patient's response to pain medication is appropriate per POSS sedation scale  6.  Follow pain management plan developed in collaboration with patient and interdisciplinary team (including palliative care or pain specialists if applicable)  Outcome: Progressing  Note: Patient c/o of pain in right UE, updated provider, pain controlled with PRN Tylenol. Upper right extremity US completed on NOC shift.      Problem: Psychosocial  Goal: Patient's ability to verbalize feelings about condition will improve  Description: Target End Date:  Prior to discharge or change in level of care    1.  Discuss coping with medical condition and its effects  2.  Encourage patient participation in care  3.  Encourage acknowledgement of body changes and accompanying emotions  4.  Perform depression screening  Outcome: Progressing  Flowsheets (Taken 9/21/2024 0040)  Condition Will Improve:   Encouraged patient participation in care   Encouraged acknowledgement of body changes and emotions  Note: Patient encouraged to voice feelings/concerns. Patient having trouble falling asleep and mild anxiety, utilized therapeutic communication with  patient and provided comfort measures. NOC MD added Atarax for sleep.      Problem: Mobility  Goal: Patient's capacity to carry out activities will improve  Description: Target End Date:  Prior to discharge or change in level of care    1.  Assess for barriers to mobility/activity  2.  Implement activity per interdisciplinary team recommendations  3.  Target activity level identified and patient/family/caregiver aware of goal  4.  Provide assistive devices  5.  Instruct patient/caregiver on proper use of assistive/adaptive devices  6.  Schedule activities and rest periods to decrease effects of fatigue  7.  Encourage mobilization to extent of ability  8.  Maintain proper body alignment  9.  Provide adequate pain management to allow progressive mobilization  10. Implement pace maker precautions as needed  Outcome: Progressing  Flowsheets (Taken 9/21/2024 0040)  Mobility:   Encouraged mobilization per interdisciplinary team recommendations   Provided assistive devices   Monitored for signs of activity intolerance  Note: Patient sat up in chair for dinner, walked back to bed, patient tires quickly with a shuffling gait. Able to mobilize with 1-2 person assist. Family member at bedside helping with mobilization.

## 2024-09-22 NOTE — PROGRESS NOTES
Pt arrives to discharge lounge. Pt [place]ian speaking, daughter translates for her. Discussed pt's care with S1 charge. Pt w/o c/o, denies needs. Discharge instructions given to patient, she verbalizes understanding and states plans for follow-up. No PIV in place. Pt and family state plans for follow-up with PCP as recommended. Pt to follow up with [facility] for F/U appt and labs. Individualized instruction and discharge information provided on Heart Failure, low sodium/fluid restricted diet, new and home medication review, post-discharge activity level, smoking/etoh cessation and worsening of symptoms needing follow-up care.  Heart failure book reviewed with and given to pt and family. All belongings accounted for, all questions answered at this time. All belongings accounted for, all questions answered at this time. Pt helped out ride.